# Patient Record
Sex: MALE | Race: WHITE | HISPANIC OR LATINO | Employment: FULL TIME | URBAN - METROPOLITAN AREA
[De-identification: names, ages, dates, MRNs, and addresses within clinical notes are randomized per-mention and may not be internally consistent; named-entity substitution may affect disease eponyms.]

---

## 2019-02-19 ENCOUNTER — HISTORICAL (OUTPATIENT)
Dept: ADMINISTRATIVE | Facility: HOSPITAL | Age: 54
End: 2019-02-19

## 2022-04-11 ENCOUNTER — HISTORICAL (OUTPATIENT)
Dept: ADMINISTRATIVE | Facility: HOSPITAL | Age: 57
End: 2022-04-11

## 2022-04-24 VITALS
BODY MASS INDEX: 31.92 KG/M2 | WEIGHT: 223 LBS | DIASTOLIC BLOOD PRESSURE: 94 MMHG | SYSTOLIC BLOOD PRESSURE: 132 MMHG | HEIGHT: 70 IN

## 2022-05-05 NOTE — HISTORICAL [facility] CERNER
This is a historical note converted from Nasir. Formatting and pictures may have been removed.  Please reference Nasir for original formatting and attached multimedia. Chief Complaint  PT HERE FOR RT SHOULDER PAIN X 1 YEAR, C/O SHARP CONSTANT PAIN ALL DAY.. ALSO C/O LEFT SHOULDER PAIN, H/[facility]GERY [facility], X-RAYS DONE OF BOTH TODAY.....CV  History of Present Illness  Pain getting WORST?? Pain right Shoulder laterally ?? No fever ?? No chills  ?? Pain in the right shoulder in the subacromial region ?? In the supraspinatus region ?? When actively and passively moved ?? ?? Started gradually ?? Slowly worsens with extended activity ?? Worsens at night ?? Causing an inability to sleep ?? Causes difficulty lying on affected side ?? Feels better after rest ?? Not relieved by medication ?? Shoulder joint stiffness on the right ?? joint stiffness ???? No radicular arm pain ?? No right sternoclavicular joint pain ?? No right shoulder joint swelling ?? No pain in the acromioclavicular  ? ?? No tingling of the right shoulder ?? No right shoulder numbness  ?? Range of motion was abnormal difficulty reaching over head using right arm ?? Range of motion was abnormal difficulty combing hair using right arm ?? Range of motion was abnormal difficulty taking shirt on/off using right arm  ?? No neck pain on right Percervical pain ?? Not in the trapezius Trapezius pain  Pain is significantly effecting quality of life  ?   left shoulder giving some pain at times but not as severe as right  has surgery on right in 2004  Review of Systems  Review of Systems?  ?????Constitutional: ?No fever, No chills. ?  ?????Respiratory: ?No shortness of breath, No cough. ?  ?????Cardiovascular: ?No chest pain. ?  ?????Gastrointestinal: ?No nausea, No vomiting, No diarrhea, No constipation, No heartburn. ?  ?????Genitourinary: ?No dysuria, No hematuria. ?  ?????Hematology/Lymphatics: ?No bleeding tendency. ?  ?????Endocrine: ?No polyuria.  ?  ?????Neurologic: ?Alert and oriented X4, No numbness, No tingling. ?  ?????Psychiatric: ?No anxiety, No depression. ?  ?????Integumentary: no abnormalities except as noted in history of present illness  Physical Exam  Vitals & Measurements  HR:?86(Peripheral)? BP:?132/94?  HT:?177.80?cm? WT:?101.15?kg? BMI:?32?  ??????????  Musculoskeletal System:  ???  Shoulder:  ?.  ??NO atrophy of the deltoid muscle  ??NO atrophy of the supraspinatus muscle  ??NO atrophy of the infraspinatus muscle  ?  ???  Right Shoulder:??. ? Acromial tenderness on palpation. ? Tenderness on palpation of the subacromial bursa. ? Tenderness on palpation of the supraspinatus muscle. ? Tenderness on palpation of the infraspinatus muscle. ? no Tenderness on palpation of the glenohumeral joint region. ? Tenderness on palpation at the bicipital groove. ? Tenderness on palpation of the trapezius muscle. ? Subacromial crepitus on motion was noted.  ???  Left Shoulder:?? . ? Acromial tenderness on palpation. ? Tenderness on palpation of the subacromial bursa. ? Tenderness on palpation of the supraspinatus muscle. ? Tenderness on palpation of the infraspinatus muscle. ?no Tenderness on palpation of the glenohumeral joint region. ? Tenderness on palpation at the bicipital groove. ? Tenderness on palpation of the trapezius muscle. ? Subacromial crepitus on motion was noted.  ???  Right Shoulder:  ???  Shoulder Motion:??????????????Value?????????Normal Range  ???  Active abduction????????????????90 degrees  Active forward flexion????????????????150 degrees  Active external rotation???????????????40 degrees  Active internal rotation???????????????30 degrees  ???  ??NO swelling medial sternoclavicular.  ??NO swelling.  ??NO induration.  ??NO erythema.  ??NO long head biceps deformity.  ??NO winged scapula.  ??Motion was abnormal.  ??pain was elicited during a Neer impingement test.  ? pain was elicited during a Bonilla-Leonardo impingement  test.  ??????  Left Shoulder:  ???  Shoulder Motion:??????Normal Range  ???  ?   ???????????????????????????????????????????????????????????????????????????????  Clavicle:  ???  General/bilateral:???Acromioclavicular joint showed NO pain elicited by motion distal right.  ???  Right Clavicle:?? Right sternoclavicular joint showed tenderness on palpation. ? Right acromioclavicular joint showed tenderness on palpation.  ???  Left Clavicle:?? Left sternoclavicular joint showed tenderness on palpation. ? Left acromioclavicular joint showed tenderness on palpation.  ???  ???  Neurological:  ???  ? NO abnormalities were noted Sensibility intact distally on right.  ??Oriented to time, place, and person.  ???  Sensation:  ??NO sensory exam abnormalities were noted Decreased median sensation.  ??NO sensory exam abnormalities were noted Decreased ulnar sensation.  ??NO sensory exam abnormalities were noted Decreased radial sensation.  ???  Motor (Strength):  ? weakness of the right shoulder was observed with resisted abduction  ??NO weakness of the left shoulder was observed.  ??????????????????????????????????????????????????????????????????????????????  Skin:  ??NO ecchymosis on the shoulders left.  ??NO ecchymosis on the shoulders right.  ???  Injury / Incision Site:???NO scar.  ???  TESTS  ???  Imaging:  ???  X-Ray Shoulder:?Complete, two or more views of the true AP of the glenohumeral joint were performed??????????????????????????????????????????????????????????????????????????????????  NO radiographic evidence of any osteoarticular abnormality??????????  ?   well maintained joint spaces bilateral shoulders  ?   ??????????????????????????????????????????????????????????????????????????????????????????????????????????????????????????????????????????????????  ?  ASSESSMENT  ?   ? Partial tear of rotator cuff tendon  ? Adhesive capsulitis of right shoulder  ???  ???  PLAN  ?   ? Physical therapy service  ? Home  exercises  NSAIDS  ?   right shoulder injection  Administered corticosteroid injection?? ? 2cc cortisone and 2cc lidocaine using sterile technique after informed verbal consent. Risks discussed with patient prior to injection. Informed the patient of the following:  -?Explained to patient that this injection in some patients will experience increased pain a few minutes after the injection and that the pain will last anywhere from 10 to 20 minutes. In order to decrease the pain you need to do the following:  o?Make sure to move the extremity in which the injection was given. If you do not move the medication sits there and can cause more pain.  o?Ice the affected joint every 2 hours for 20 minutes at a time for the next 24 hours.  o?If you are not already taking an anti-inflammatory take the following Ibuprofen/ Advil take 3 to 4 tablets every 6 to 8 hours or 2 Aleve every 12 hours for the next 5 days to help the medication work. If you cannot take anti-inflammatory take 2 extra strength Tylenol every 6 hours for the next 5 days.  ??Patient voiced understanding ?????????????????????????????????????????????????????????????????????????????  ?  Assessment/Plan  1.?Partial tear of right rotator cuff?M75.111  Ordered:  meloxicam, 15 mg = 1 tab(s), Oral, Daily, # 30 tab(s), 0 Refill(s), Pharmacy: [id number]  Clinic Follow up, *Est. 03/19/19 3:00:00 CDT, Order for future visit, Partial tear of right rotator cuff, Orthopaedics  Office/Outpatient Visit Level 3 [id number], Partial tear of right rotator cuff, Orthopaedics Clinic, 02/19/19 14:14:00 CST  ?  Right shoulder pain?M25.511  Ordered:  XR Shoulder Right Minimum 2 Views, Routine, 02/19/19 13:18:00 CST, Pain, None, Ambulatory, Rad Type, Right shoulder pain, 02/19/19 13:18:00 CST  ?  Orders:  XR Shoulder Left Minimum 2 Views, Routine, 02/19/19 13:23:00 CST, Pain, None, Ambulatory, Rad Type, Left shoulder pain, 02/19/19 13:23:00 CST   Problem List/Past  Medical History  Ongoing  Obesity  Partial tear of left rotator cuff  Partial tear of right rotator cuff  Historical  No qualifying data  Procedure/Surgical History  Shoulder region   Medications  amLODIPine-benazepril 10 mg-40 mg oral capsule, See Instructions, 5 refills  atorvastatin 40 mg oral tablet, 40 mg= 1 tab(s), Oral, Daily  Mobic 15 mg oral tablet, 15 mg= 1 tab(s), Oral, Daily  Allergies  No active allergies  Social History  Alcohol  Current, Beer, 3-5 times per week, 02/19/2019  Never, 02/19/2019  Substance Abuse  Never, 02/19/2019  Tobacco  10 or more cigarettes (1/2 pack or more)/day in last 30 days, N/A, Ready to change: No., 02/19/2019  Health Maintenance  Health Maintenance  ???Pending?(in the next year)  ??? ??Due?  ??? ? ? ?ADL Screening due??02/19/19??and every 1??year(s)  ??? ? ? ?Alcohol Misuse Screening due??02/19/19??and every 1??year(s)  ??? ? ? ?Aspirin Therapy for CVD Prevention due??02/19/19??and every 1??year(s)  ??? ? ? ?Tetanus Vaccine due??02/19/19??and every 10??year(s)  ???Satisfied?(in the past 1 year)  ??? ??Satisfied?  ??? ? ? ?Blood Pressure Screening on??02/19/19.??Satisfied by Marcie Scruggs LPN  ??? ? ? ?Body Mass Index Check on??02/19/19.??Satisfied by Marcie Scruggs LPN  ??? ? ? ?Obesity Screening on??02/19/19.??Satisfied by Marcie Scruggs LPN  ?  ?

## 2023-09-12 DIAGNOSIS — Z87.891 HISTORY OF TOBACCO USE: Primary | ICD-10-CM

## 2023-10-12 ENCOUNTER — HOSPITAL ENCOUNTER (OUTPATIENT)
Dept: RADIOLOGY | Facility: HOSPITAL | Age: 58
Discharge: HOME | End: 2023-10-12
Attending: STUDENT IN AN ORGANIZED HEALTH CARE EDUCATION/TRAINING PROGRAM
Payer: OTHER GOVERNMENT

## 2023-10-12 DIAGNOSIS — Z87.891 HISTORY OF TOBACCO USE: ICD-10-CM

## 2023-10-12 PROCEDURE — 71271 CT THORAX LUNG CANCER SCR C-: CPT | Mod: TC
